# Patient Record
Sex: FEMALE | Employment: OTHER | URBAN - METROPOLITAN AREA
[De-identification: names, ages, dates, MRNs, and addresses within clinical notes are randomized per-mention and may not be internally consistent; named-entity substitution may affect disease eponyms.]

---

## 2019-07-02 NOTE — PATIENT DISCUSSION
(N25.350) Keratoconjunct sicca, not specified as Sjogren's, bilateral - Assesment : Examination revealed Dry Eye Syndrome - Plan : Monitor for changes. ATs recommended 2-4 times per day and prn. If itching okay to use OTC Zaditor prn for itching.

## 2019-07-02 NOTE — PATIENT DISCUSSION
(H25.13) Age-related nuclear cataract, bilateral - Assesment : Examination revealed cataract. Mild OU. - Plan : Monitor for changes. Continue OTC Readers prn. Updated GLRx given today. Advised patient to call our office with decreased vision or increased symptoms. RTC in 1 year for Exam and OCT ONH (slight nerve asymmetry), sooner if problems or changes.

## 2020-07-20 NOTE — PATIENT DISCUSSION
Monitor for changes. Continue OTC Readers prn. Updated GLRx given today. Advised patient to call our office with decreased vision or increased symptoms.

## 2020-07-20 NOTE — PATIENT DISCUSSION
Monitor for changes. ATs recommended 2-4 times per day and prn. TheraTears Extra sample given today.

## 2021-04-13 ENCOUNTER — NEW PATIENT (OUTPATIENT)
Dept: URBAN - METROPOLITAN AREA CLINIC 11 | Facility: CLINIC | Age: 71
End: 2021-04-13

## 2021-04-13 ASSESSMENT — TONOMETRY
OD_IOP_MMHG: 18
OS_IOP_MMHG: 16

## 2021-04-13 ASSESSMENT — VISUAL ACUITY
OS_SC: 20/20
OD_SC: 20/30

## 2021-04-13 NOTE — PATIENT DISCUSSION
40 minutes spent in face to face dialogue with patient. I have recommended that she have vitrectomy surgery to remove the bothersome, visually significant floaters from her right eye. All questions and concerns addressed at length and she would like to proceed with scheduling.

## 2021-05-05 ENCOUNTER — SURGERY/PROCEDURE (OUTPATIENT)
Dept: URBAN - METROPOLITAN AREA EYE CENTER 1 | Facility: EYE CENTER | Age: 71
End: 2021-05-05

## 2021-05-05 DIAGNOSIS — H43.313: ICD-10-CM

## 2021-05-05 PROCEDURE — 67036 REMOVAL OF INNER EYE FLUID: CPT

## 2021-05-05 NOTE — PATIENT DISCUSSION
Good post-operative appearance. All drops and instructions reviewed with patient. I will see her in 1 week.

## 2021-05-06 ENCOUNTER — POST-OP CHECK (OUTPATIENT)
Dept: URBAN - METROPOLITAN AREA CLINIC 11 | Facility: CLINIC | Age: 71
End: 2021-05-06

## 2021-05-06 DIAGNOSIS — H43.313: ICD-10-CM

## 2021-05-06 PROCEDURE — 99024 POSTOP FOLLOW-UP VISIT: CPT

## 2021-05-06 RX ORDER — PREDNISOLONE ACETATE 10 MG/ML
1 SUSPENSION/ DROPS OPHTHALMIC
Start: 2021-05-06

## 2021-05-06 ASSESSMENT — VISUAL ACUITY
OS_SC: 20/20
OD_SC: 20/40-2

## 2021-05-06 ASSESSMENT — TONOMETRY: OD_IOP_MMHG: 08

## 2021-05-13 ENCOUNTER — POST-OP CHECK (OUTPATIENT)
Dept: URBAN - METROPOLITAN AREA CLINIC 11 | Facility: CLINIC | Age: 71
End: 2021-05-13

## 2021-05-13 DIAGNOSIS — H43.313: ICD-10-CM

## 2021-05-13 PROCEDURE — 99024 POSTOP FOLLOW-UP VISIT: CPT

## 2021-05-13 ASSESSMENT — TONOMETRY: OD_IOP_MMHG: 17

## 2021-05-13 ASSESSMENT — VISUAL ACUITY
OD_SC: 20/20-2
OS_SC: 20/20

## 2021-05-13 NOTE — PATIENT DISCUSSION
Good post-operative appearance. All drops and instructions reviewed with patient. She will be seeing you in 1 month. She will return to see me if she decides to have vitrectomy surgery on her left eye.  CPT 46174,  H43.311.

## 2023-04-25 ENCOUNTER — COMPREHENSIVE EXAM (OUTPATIENT)
Facility: LOCATION | Age: 73
End: 2023-04-25

## 2023-04-25 DIAGNOSIS — H52.11: ICD-10-CM

## 2023-04-25 DIAGNOSIS — H35.373: ICD-10-CM

## 2023-04-25 DIAGNOSIS — H43.313: ICD-10-CM

## 2023-04-25 DIAGNOSIS — H43.811: ICD-10-CM

## 2023-04-25 PROCEDURE — 92134 CPTRZ OPH DX IMG PST SGM RTA: CPT

## 2023-04-25 PROCEDURE — 92015 DETERMINE REFRACTIVE STATE: CPT

## 2023-04-25 PROCEDURE — 92014 COMPRE OPH EXAM EST PT 1/>: CPT

## 2023-04-25 ASSESSMENT — VISUAL ACUITY
OD_SC: 20/30+/-
OS_SC: 20/20

## 2023-04-25 ASSESSMENT — KERATOMETRY
OD_K1POWER_DIOPTERS: 40.50
OS_AXISANGLE2_DEGREES: 5
OD_AXISANGLE2_DEGREES: 165
OD_AXISANGLE_DEGREES: 75
OS_K2POWER_DIOPTERS: 41.50
OD_K2POWER_DIOPTERS: 41.00
OS_K1POWER_DIOPTERS: 38.75
OS_AXISANGLE_DEGREES: 95

## 2023-04-25 ASSESSMENT — TONOMETRY
OD_IOP_MMHG: 20
OS_IOP_MMHG: 17

## 2023-10-31 ENCOUNTER — CONTACT LENSES/GLASSES VISIT (OUTPATIENT)
Facility: LOCATION | Age: 73
End: 2023-10-31

## 2023-10-31 DIAGNOSIS — H02.831: ICD-10-CM

## 2023-10-31 DIAGNOSIS — H52.13: ICD-10-CM

## 2023-10-31 DIAGNOSIS — H02.834: ICD-10-CM

## 2023-10-31 DIAGNOSIS — H18.513: ICD-10-CM

## 2023-10-31 DIAGNOSIS — H43.811: ICD-10-CM

## 2023-10-31 DIAGNOSIS — H52.4: ICD-10-CM

## 2023-10-31 DIAGNOSIS — H52.203: ICD-10-CM

## 2023-10-31 PROCEDURE — 92012 INTRM OPH EXAM EST PATIENT: CPT

## 2023-10-31 PROCEDURE — 92015 DETERMINE REFRACTIVE STATE: CPT

## 2023-10-31 ASSESSMENT — KERATOMETRY
OS_K1POWER_DIOPTERS: 37.75
OD_AXISANGLE_DEGREES: 88
OS_K2POWER_DIOPTERS: 41.00
OD_AXISANGLE2_DEGREES: 178
OS_AXISANGLE_DEGREES: 85
OD_K2POWER_DIOPTERS: 40.75
OD_K1POWER_DIOPTERS: 39.25
OS_AXISANGLE2_DEGREES: 175

## 2023-10-31 ASSESSMENT — VISUAL ACUITY
OD_CC: 20/50+2
OS_SC: 20/70
OD_SC: 20/70
OS_CC: 20/20-1
OU_SC: 20/40-1
OD_PH: 20/25-1
OD_CC: J1
OS_CC: J1

## 2024-11-05 ENCOUNTER — COMPREHENSIVE EXAM (OUTPATIENT)
Facility: LOCATION | Age: 74
End: 2024-11-05

## 2024-11-05 DIAGNOSIS — H43.813: ICD-10-CM

## 2024-11-05 DIAGNOSIS — H02.831: ICD-10-CM

## 2024-11-05 DIAGNOSIS — H52.13: ICD-10-CM

## 2024-11-05 DIAGNOSIS — H18.513: ICD-10-CM

## 2024-11-05 DIAGNOSIS — H02.054: ICD-10-CM

## 2024-11-05 DIAGNOSIS — H02.834: ICD-10-CM

## 2024-11-05 DIAGNOSIS — H02.051: ICD-10-CM

## 2024-11-05 DIAGNOSIS — H52.4: ICD-10-CM

## 2024-11-05 DIAGNOSIS — H52.203: ICD-10-CM

## 2024-11-05 PROCEDURE — 92015 DETERMINE REFRACTIVE STATE: CPT

## 2024-11-05 PROCEDURE — 92014 COMPRE OPH EXAM EST PT 1/>: CPT

## 2024-11-05 PROCEDURE — 67820 REVISE EYELASHES: CPT

## 2024-12-10 ENCOUNTER — CONSULTATION/EVALUATION (OUTPATIENT)
Age: 74
End: 2024-12-10

## 2024-12-10 DIAGNOSIS — H02.831: ICD-10-CM

## 2024-12-10 DIAGNOSIS — H02.834: ICD-10-CM

## 2024-12-10 PROCEDURE — 92081 LIMITED VISUAL FIELD XM: CPT

## 2024-12-10 PROCEDURE — 92012 INTRM OPH EXAM EST PATIENT: CPT

## 2024-12-10 PROCEDURE — 92285 EXTERNAL OCULAR PHOTOGRAPHY: CPT

## 2025-02-05 ENCOUNTER — SURGERY/PROCEDURE (OUTPATIENT)
Age: 75
End: 2025-02-05

## 2025-02-05 DIAGNOSIS — H02.834: ICD-10-CM

## 2025-02-05 DIAGNOSIS — H02.831: ICD-10-CM

## 2025-02-05 PROCEDURE — 1582350 UPPER BLEPH PER EYE FUNCTIONAL-BILATERAL: Mod: 50

## 2025-02-18 ENCOUNTER — POST-OP (OUTPATIENT)
Age: 75
End: 2025-02-18

## 2025-02-18 DIAGNOSIS — Z98.890: ICD-10-CM

## 2025-02-18 PROCEDURE — 99024 POSTOP FOLLOW-UP VISIT: CPT

## 2025-03-24 ENCOUNTER — EMERGENCY VISIT (OUTPATIENT)
Age: 75
End: 2025-03-24

## 2025-03-24 DIAGNOSIS — T15.01XA: ICD-10-CM

## 2025-03-24 PROCEDURE — 92012 INTRM OPH EXAM EST PATIENT: CPT

## 2025-05-05 ENCOUNTER — FOLLOW UP (OUTPATIENT)
Age: 75
End: 2025-05-05

## 2025-05-05 DIAGNOSIS — H18.513: ICD-10-CM

## 2025-05-05 DIAGNOSIS — H43.813: ICD-10-CM

## 2025-05-05 DIAGNOSIS — H35.373: ICD-10-CM

## 2025-05-05 PROCEDURE — 92012 INTRM OPH EXAM EST PATIENT: CPT

## 2025-05-05 PROCEDURE — 92134 CPTRZ OPH DX IMG PST SGM RTA: CPT
